# Patient Record
Sex: FEMALE | Race: BLACK OR AFRICAN AMERICAN | Employment: FULL TIME | URBAN - METROPOLITAN AREA
[De-identification: names, ages, dates, MRNs, and addresses within clinical notes are randomized per-mention and may not be internally consistent; named-entity substitution may affect disease eponyms.]

---

## 2019-02-25 ENCOUNTER — HOSPITAL ENCOUNTER (EMERGENCY)
Age: 24
Discharge: HOME | End: 2019-02-25
Attending: EMERGENCY MEDICINE
Payer: COMMERCIAL

## 2019-02-25 VITALS
BODY MASS INDEX: 17.27 KG/M2 | DIASTOLIC BLOOD PRESSURE: 71 MMHG | TEMPERATURE: 97.8 F | RESPIRATION RATE: 18 BRPM | OXYGEN SATURATION: 99 % | HEIGHT: 67 IN | SYSTOLIC BLOOD PRESSURE: 108 MMHG | HEART RATE: 88 BPM | WEIGHT: 110 LBS

## 2019-02-25 DIAGNOSIS — N94.6 PAINFUL MENSTRUAL PERIODS: Primary | ICD-10-CM

## 2019-02-25 LAB
GLUCOSE BLD STRIP.AUTO-MCNC: 95 MG/DL (ref 65–100)
HCG UR QL: NEGATIVE
SERVICE CMNT-IMP: NORMAL
WET PREP GENITAL: NORMAL
WET PREP GENITAL: NORMAL

## 2019-02-25 PROCEDURE — 81025 URINE PREGNANCY TEST: CPT

## 2019-02-25 PROCEDURE — 87491 CHLMYD TRACH DNA AMP PROBE: CPT

## 2019-02-25 PROCEDURE — 82962 GLUCOSE BLOOD TEST: CPT

## 2019-02-25 PROCEDURE — 99284 EMERGENCY DEPT VISIT MOD MDM: CPT | Performed by: PHYSICIAN ASSISTANT

## 2019-02-25 PROCEDURE — 81003 URINALYSIS AUTO W/O SCOPE: CPT | Performed by: PHYSICIAN ASSISTANT

## 2019-02-25 PROCEDURE — 87210 SMEAR WET MOUNT SALINE/INK: CPT

## 2019-02-25 RX ORDER — NAPROXEN 500 MG/1
500 TABLET ORAL 2 TIMES DAILY WITH MEALS
Qty: 20 TAB | Refills: 0 | Status: SHIPPED | OUTPATIENT
Start: 2019-02-25 | End: 2019-03-07

## 2019-02-25 NOTE — ED PROVIDER NOTES
[facility] Emergency Department     TRIAGE Provider NOTE:  53-year-old female presents to the emergency department with some lower abdominal pain. Not feeling well. No fever. No chills    She had a stomach bug over the weekend. Vomiting Saturday/Sunday. None since then    Check a fingerstick glucose, urine/ urine pregnancy    Miguel Scales MD; 2/25/2019 @11:48 AM============================     Lisa Tucker is a 26 y.o. female seen on 2/25/2019 at 11:48 AM in the [facility] EMERGENCY DEPT     HPI: Pt reports pain around her period for years, increased pressure past few day, some relief with otc meds, not concerned with std        Pelvic Pain    This is a chronic problem. Episode onset: years. The problem occurs constantly. The problem has not changed since onset. The pain is associated with an unknown factor. The pain is located in the suprapubic region. The pain is at a severity of 1/10. The pain is mild. Pertinent negatives include no diarrhea, no vomiting, no constipation, no dysuria and no frequency. Nothing worsens the pain. The pain is relieved by NSAIDs. Review of Systems: Review of Systems   Gastrointestinal: Negative for constipation, diarrhea and vomiting. Genitourinary: Negative for dysuria and frequency. All other systems reviewed and are negative. PAST MEDICAL HISTORY:  Primary Care Doctor: No primary care provider on file. None  History reviewed. No pertinent past medical history. History reviewed. No pertinent surgical history. Cannot display prior to admission medications because the patient has not been admitted in this contact. No Known Allergies      Physical Exam:  Nursing documentation reviewed. Vitals:    02/25/19 1146   BP: 101/66   Pulse: 87   Resp: 16   Temp: 97.5 °F (36.4 °C)   SpO2: 100%   Vital signs were reviewed. Physical Exam   Constitutional: She is oriented to person, place, and time. She appears well-developed and well-nourished.  No distress. HENT:   Head: Normocephalic and atraumatic. Eyes: EOM are normal. Pupils are equal, round, and reactive to light. Neck: Normal range of motion. Neck supple. Cardiovascular: Normal rate and regular rhythm. Pulmonary/Chest: Effort normal and breath sounds normal.   Abdominal: Soft. Bowel sounds are normal. There is tenderness. Mild suprapubic area   Genitourinary: Vagina normal. No tenderness in the vagina. No vaginal discharge found. Musculoskeletal: Normal range of motion. Neurological: She is alert and oriented to person, place, and time. Skin: Skin is warm. She is not diaphoretic. Psychiatric: She has a normal mood and affect. Nursing note and vitals reviewed. Medical Decision Making  MDM  Number of Diagnoses or Management Options  Diagnosis management comments: Urine dip and hcg-  Wet prep -  Will treat pain with naprosyn       Amount and/or Complexity of Data Reviewed  Clinical lab tests: ordered and reviewed    Risk of Complications, Morbidity, and/or Mortality  Presenting problems: low  Diagnostic procedures: low  Management options: low    Patient Progress  Patient progress: improved        Procedures     ED Evaluation:  LABS: No results found for this or any previous visit (from the past 24 hour(s)).      RADIOLOGY:   No orders to display     _____________________________________________________________________

## 2019-02-25 NOTE — ED TRIAGE NOTES
Pt arrives via POV from home, pt states she just got over a stomach virus and is still a little queasy. Pt states she also is having some vaginal discomfort, pt denies urinary symptoms but would like to be checked.

## 2019-02-25 NOTE — ED NOTES
I have reviewed discharge instructions with the patient. The patient verbalized understanding. Patient left ED via Discharge Method: ambulatory to Home with mother     Opportunity for questions and clarification provided. Patient given 1 scripts. To continue your aftercare when you leave the hospital, you may receive an automated call from our care team to check in on how you are doing. This is a free service and part of our promise to provide the best care and service to meet your aftercare needs.  If you have questions, or wish to unsubscribe from this service please call [phone]. Thank you for Choosing our [facility] Emergency Department.

## 2019-02-25 NOTE — LETTER
[facility] EMERGENCY DEPT [address] [id number] [phone] Work/School Note Date: 2/25/2019 To Whom It May concern: Yvette Hopper was seen and treated today in the emergency room by the following provider(s): 
Attending Provider: Minal Pedroza MD 
Physician Assistant: JAMIE Meehan. Yvette Hopper may return to work on 2-26-19. Sincerely, JAMIE Chavez

## 2019-02-27 LAB
C TRACH RRNA SPEC QL NAA+PROBE: NEGATIVE
N GONORRHOEA RRNA SPEC QL NAA+PROBE: NEGATIVE
SPECIMEN SOURCE: NORMAL

## 2022-10-26 ENCOUNTER — OFFICE VISIT (OUTPATIENT)
Dept: OBGYN CLINIC | Age: 27
End: 2022-10-26
Payer: COMMERCIAL

## 2022-10-26 VITALS
BODY MASS INDEX: 16.73 KG/M2 | WEIGHT: 106.6 LBS | DIASTOLIC BLOOD PRESSURE: 62 MMHG | SYSTOLIC BLOOD PRESSURE: 108 MMHG | HEIGHT: 67 IN

## 2022-10-26 DIAGNOSIS — Z13.89 SCREENING FOR GENITOURINARY CONDITION: ICD-10-CM

## 2022-10-26 DIAGNOSIS — Z01.419 WELL WOMAN EXAM: Primary | ICD-10-CM

## 2022-10-26 LAB
BILIRUBIN, URINE, POC: NEGATIVE
BLOOD URINE, POC: NEGATIVE
GLUCOSE URINE, POC: NEGATIVE
KETONES, URINE, POC: NEGATIVE
LEUKOCYTE ESTERASE, URINE, POC: NEGATIVE
NITRITE, URINE, POC: NEGATIVE
PH, URINE, POC: 6 (ref 4.6–8)
PROTEIN,URINE, POC: NEGATIVE
SPECIFIC GRAVITY, URINE, POC: 1.02 (ref 1–1.03)
URINALYSIS CLARITY, POC: CLEAR
URINALYSIS COLOR, POC: YELLOW
UROBILINOGEN, POC: NORMAL

## 2022-10-26 PROCEDURE — 81003 URINALYSIS AUTO W/O SCOPE: CPT | Performed by: NURSE PRACTITIONER

## 2022-10-26 PROCEDURE — 99385 PREV VISIT NEW AGE 18-39: CPT | Performed by: NURSE PRACTITIONER

## 2022-10-26 NOTE — PROGRESS NOTES
Patient presents today for a routine gynecological examination with no complaints. Has had some breast tenderness for the last couple of weeks, but notes she was ovulating. Cycles every 28 lasting 4. Denies menorrhagia/dysmenorrhea. Drinks 2 cups coffee/day. Denies family h/o breast cancer. Denies nipple discharge/inversion. Denies palpable lumps/bumps. Denies trauma. Denies skin changes/redness/dimpling. OB History          0    Para   0    Term   0       0    AB   0    Living   0         SAB   0    IAB   0    Ectopic   0    Molar   0    Multiple   0    Live Births   0                  GYN History     Last pap: 2 years ago - WNL per pt      Patient's last menstrual period was 10/04/2022 (exact date). Cycle Length 28 Lasting 4  negative dysmenorrhea; negative postcoital bleeding    Past Medical History:  History reviewed. No pertinent past medical history. Past Surgical History:  History reviewed. No pertinent surgical history. Allergies:   No Known Allergies    Medication History:  No current outpatient medications on file. No current facility-administered medications for this visit.        Social History:  Social History     Socioeconomic History    Marital status: Single     Spouse name: Not on file    Number of children: Not on file    Years of education: Not on file    Highest education level: Not on file   Occupational History    Not on file   Tobacco Use    Smoking status: Never    Smokeless tobacco: Never   Substance and Sexual Activity    Alcohol use: Yes    Drug use: Never    Sexual activity: Yes     Birth control/protection: None   Other Topics Concern    Not on file   Social History Narrative    Not on file     Social Determinants of Health     Financial Resource Strain: Not on file   Food Insecurity: Not on file   Transportation Needs: Not on file   Physical Activity: Not on file   Stress: Not on file   Social Connections: Not on file   Intimate Partner Violence: Not on file Housing Stability: Not on file       Family History:  Family History   Problem Relation Age of Onset    Cervical Cancer Mother     No Known Problems Father        Review of Systems - General ROS: negative except for that discussed in HPI      ROS:  Feeling well. No dyspnea or chest pain on exertion. No abdominal pain, change in bowel habits, black or bloody stools. No urinary tract symptoms. No neurological complaints. Objective:   /62   Ht 5' 7\" (1.702 m)   Wt 106 lb 9.6 oz (48.4 kg)   LMP 10/04/2022 (Exact Date)   BMI 16.70 kg/m²   The patient appears well, alert, oriented x 3, in no distress. ENT normal.  Neck supple. No adenopathy or thyromegaly. Lungs:  clear, good air entry, no wheezes, rhonchi or rales. Heart:  S1 and S2 normal, no murmurs, regular rate and rhythm. Abdomen:  soft without tenderness, guarding, mass or organomegaly. Extremities show no edema, normal peripheral pulses. Neurological is normal, no focal findings. BREAST EXAM: breasts appear normal, no suspicious masses, no skin or nipple changes or axillary nodes, symmetric fibrous changes bilaterally    PELVIC EXAM: VULVA: normal appearing vulva with no masses, tenderness or lesions, VAGINA: normal appearing vagina with normal color and discharge, no lesions, CERVIX: normal appearing cervix without discharge or lesions, UTERUS: uterus is normal size, shape, consistency and nontender, ADNEXA: normal adnexa in size, nontender and no masses    Assessment/Plan:     1. Well woman exam    - AMB POC URINALYSIS DIP STICK AUTO W/O MICRO  - PAP IG, Liquid-Based Rfx Aptima HPV when ASC-U, ASC-H, LSIL, HSIL, ARUN; Future    2. Screening for genitourinary condition    - AMB POC URINALYSIS DIP STICK AUTO W/O MICRO     pap smear  Denies need for med refills  Offered US to eval breast pain, but as she's young, with low risk factors, I  suspect changes likely hormonal in nature.   Would like for her to give symptoms a couple of weeks, but if no improvements, can send order for imaging  Discussed effects of caffeine on breast tissue  Vit E encouraged  Pt likes this plan   return annually or prn    Supervising physician is Dr. Sher Guzman.

## 2022-11-09 LAB
CYTOLOGIST CVX/VAG CYTO: ABNORMAL
CYTOLOGY CVX/VAG DOC THIN PREP: ABNORMAL
HPV APTIMA: POSITIVE
HPV REFLEX: ABNORMAL
Lab: ABNORMAL
PATH REPORT.FINAL DX SPEC: ABNORMAL
PATHOLOGIST CVX/VAG CYTO: ABNORMAL
PATHOLOGIST PROVIDED ICD: ABNORMAL
RECOM F/U CVX/VAG CYTO: ABNORMAL
STAT OF ADQ CVX/VAG CYTO-IMP: ABNORMAL

## 2022-11-14 NOTE — RESULT ENCOUNTER NOTE
Attempted to call pt with pap results and f/u needed. No answer and VM has not been set up. Unable to LM.

## 2022-11-21 NOTE — RESULT ENCOUNTER NOTE
Unable to contact pt regarding her pap results. No answer, VM not set up so unable to leave a message.